# Patient Record
Sex: FEMALE | Race: BLACK OR AFRICAN AMERICAN | NOT HISPANIC OR LATINO | Employment: UNEMPLOYED | URBAN - METROPOLITAN AREA
[De-identification: names, ages, dates, MRNs, and addresses within clinical notes are randomized per-mention and may not be internally consistent; named-entity substitution may affect disease eponyms.]

---

## 2024-08-18 ENCOUNTER — HOSPITAL ENCOUNTER (EMERGENCY)
Facility: HOSPITAL | Age: 19
Discharge: HOME | End: 2024-08-18
Payer: COMMERCIAL

## 2024-08-18 VITALS
WEIGHT: 104.5 LBS | DIASTOLIC BLOOD PRESSURE: 79 MMHG | SYSTOLIC BLOOD PRESSURE: 131 MMHG | RESPIRATION RATE: 16 BRPM | TEMPERATURE: 97.5 F | OXYGEN SATURATION: 100 % | HEART RATE: 79 BPM

## 2024-08-18 DIAGNOSIS — L25.9 CONTACT DERMATITIS: Primary | ICD-10-CM

## 2024-08-18 PROCEDURE — 99284 EMERGENCY DEPT VISIT MOD MDM: CPT

## 2024-08-18 PROCEDURE — 99282 EMERGENCY DEPT VISIT SF MDM: CPT

## 2024-08-18 RX ORDER — PREDNISONE 20 MG/1
60 TABLET ORAL ONCE
Status: COMPLETED | OUTPATIENT
Start: 2024-08-18 | End: 2024-08-18

## 2024-08-18 RX ORDER — PREDNISONE 10 MG/1
TABLET ORAL
Qty: 86 TABLET | Refills: 0 | Status: SHIPPED | OUTPATIENT
Start: 2024-08-19 | End: 2024-09-15

## 2024-08-18 RX ORDER — PREDNISONE 10 MG/1
TABLET ORAL
Qty: 86 TABLET | Refills: 0 | Status: SHIPPED | OUTPATIENT
Start: 2024-08-19 | End: 2024-08-18

## 2024-08-18 RX ADMIN — PREDNISONE 60 MG: 20 TABLET ORAL at 13:22

## 2024-08-18 NOTE — ED PROVIDER NOTES
History  Chief Complaint   Patient presents with    Rash     Pt has had poison ivy over the past few days, pt prescribed ointment and prednisone but states it's been getting more uncomfortable since it's reached her face.      Patient is an otherwise healthy 18y female presenting to emergency department for evaluation of rash.  Patient reports on August 10 August 11 she was moving a tree that fell.  She noted a vesicular rash that started shortly thereafter on her right wrist.  She notes it was initially itchy and has spread to her arms, groin, face, chest.  She notes she was seen on 14 August, 4 days ago, and urgent care who diagnosed her with poison ivy and gave her 20 mg tablets of prednisone x 5 days.  Patient reports her rash has gotten worse since starting the steroids.  She denies fever or oral involvement.  She denies current pregnancy.        None       History reviewed. No pertinent past medical history.    History reviewed. No pertinent surgical history.    History reviewed. No pertinent family history.  I have reviewed and agree with the history as documented.    E-Cigarette/Vaping     E-Cigarette/Vaping Substances     Social History     Tobacco Use    Smoking status: Every Day    Smokeless tobacco: Never       Review of Systems   Constitutional: Negative.  Negative for chills and fever.   HENT: Negative.  Negative for ear pain and sore throat.    Eyes: Negative.  Negative for pain and visual disturbance.   Respiratory: Negative.  Negative for cough and shortness of breath.    Cardiovascular: Negative.  Negative for chest pain and palpitations.   Gastrointestinal: Negative.  Negative for abdominal pain and vomiting.   Endocrine: Negative.    Genitourinary: Negative.  Negative for dysuria and hematuria.   Musculoskeletal: Negative.  Negative for arthralgias and back pain.   Skin:  Positive for rash. Negative for color change.   Allergic/Immunologic: Negative.    Neurological: Negative.  Negative for  seizures and syncope.   Hematological: Negative.    Psychiatric/Behavioral: Negative.     All other systems reviewed and are negative.      Physical Exam  Physical Exam  Vitals and nursing note reviewed.   Constitutional:       General: She is not in acute distress.     Appearance: Normal appearance. She is not ill-appearing, toxic-appearing or diaphoretic.   HENT:      Head: Normocephalic and atraumatic.      Mouth/Throat:      Comments: No intraoral lesions  Eyes:      General: No scleral icterus.        Right eye: No discharge.         Left eye: No discharge.      Extraocular Movements: Extraocular movements intact.      Conjunctiva/sclera: Conjunctivae normal.   Cardiovascular:      Rate and Rhythm: Normal rate.      Pulses: Normal pulses.      Heart sounds: Normal heart sounds. No murmur heard.     No friction rub. No gallop.   Pulmonary:      Effort: Pulmonary effort is normal. No respiratory distress.      Breath sounds: Normal breath sounds. No stridor. No wheezing, rhonchi or rales.   Abdominal:      General: Abdomen is flat. Bowel sounds are normal. There is no distension.      Palpations: Abdomen is soft.      Tenderness: There is no abdominal tenderness. There is no guarding or rebound.   Musculoskeletal:         General: No swelling. Normal range of motion.      Cervical back: Normal range of motion. No rigidity.      Right lower leg: No edema.      Left lower leg: No edema.   Skin:     General: Skin is warm and dry.      Capillary Refill: Capillary refill takes less than 2 seconds.      Coloration: Skin is not jaundiced.      Findings: Rash present. No bruising or lesion.      Comments: Blanching maculopapular rash on both hands, across both arms, chest, abdomen. Sparse vesicular lesions on fingers.   Neurological:      General: No focal deficit present.      Mental Status: She is alert and oriented to person, place, and time. Mental status is at baseline.   Psychiatric:         Mood and Affect: Mood  normal.         Behavior: Behavior normal.         Thought Content: Thought content normal.         Judgment: Judgment normal.         Vital Signs  ED Triage Vitals [08/18/24 1257]   Temperature Pulse Respirations Blood Pressure SpO2   97.5 °F (36.4 °C) 79 16 131/79 100 %      Temp Source Heart Rate Source Patient Position - Orthostatic VS BP Location FiO2 (%)   Temporal Monitor Sitting Left arm --      Pain Score       --           Vitals:    08/18/24 1257   BP: 131/79   Pulse: 79   Patient Position - Orthostatic VS: Sitting         Visual Acuity      ED Medications  Medications   predniSONE tablet 60 mg (60 mg Oral Given 8/18/24 1322)       Diagnostic Studies  Results Reviewed       None                   No orders to display              Procedures  Procedures         ED Course                                               Medical Decision Making  18 year-old female presenting for evaluation of rash    Patient reports a recent exposure to plants.  Rash consistent with contact dermatitis.  Doubt SJS/TN.  Doubt measles, mumps, chickenpox. VSS, afebrile, non-toxic appearing.    No recent medication changes apart from prednisone mentioned above.      Strong suspicion for contact dermatitis in this patient.  Likely secondary to poison ivy.  Patient was placed on a low-dose prednisone burst, she will likely require systemic corticosteroids for severe disease.  I did place her on a prednisone taper beginning at 60 mg x 4 days and tapering there off.  Additionally she has been using steroid cream all over her body.  Recommended she stop using this as prolonged use can cause skin discoloration.  Recommended patient begin using Zanfel.  She was given strict return precautions, in the event that her rash changes she must return immediately for further evaluation.  Additionally, I personally discussed use of steroids with this patient.  Explained to her that she will need to complete the taper and cannot abruptly stop her  steroids as this could lead to catastrophic consequences including dangerously low blood pressure, confusion, etc.  She verbalized understanding. Patient was given prednisone in the emergency department to cover her for today and was discharged with prescription for remainder.  Given instructions to follow-up with her family physician office, she verbalized understanding. Ambulatory referral placed for patient to f/u w/ family medicine. Patient discharged in good condition.    Update: called patient 08/20/24, reports improvement. Reiterated return precautions, instructed to complete prednisone taper and instructed to seek PCP/return to ED for evaluation of she develops new/concerning thoughts including distressing thoughts/nervousness/anxiety.      Risk  Prescription drug management.                 Disposition  Final diagnoses:   Contact dermatitis     Time reflects when diagnosis was documented in both MDM as applicable and the Disposition within this note       Time User Action Codes Description Comment    8/18/2024  1:22 PM Xavier Bush Add [L25.9] Contact dermatitis           ED Disposition       ED Disposition   Discharge    Condition   Stable    Date/Time   Sun Aug 18, 2024  1:28 PM    Comment   Robert Shah discharge to home/self care.                   Follow-up Information       Follow up With Specialties Details Why Contact Info Additional Information    [facility] Family Medicine Schedule an appointment as soon as possible for a visit in 2 days  [address]  [address] [address] [phone] [facility], [address] [facility],  [place], [place], [address] [phone]    [facility] Emergency Department Emergency Medicine Go to  As needed, for new/worsening symptoms. [address] [place] [id number] [phone] [facility] Emergency Department, [address] [place] [address]  [place], [place], [address]            Discharge Medication List as of 8/18/2024  1:28 PM        CONTINUE these medications which have CHANGED    Details   predniSONE 10 mg tablet Multiple Dosages:Starting Mon 8/19/2024, Until Wed 8/21/2024 at 2359, THEN Starting Thu 8/22/2024, Until Sun 8/25/2024 at 2359, THEN Starting Mon 8/26/2024, Until Thu 8/29/2024 at 2359, THEN Starting Fri 8/30/2024, Until Mon 9/2/2024 at 2359, THEN S tarting Tue 9/3/2024, Until Fri 9/6/2024 at 2359, THEN Starting Sat 9/7/2024, Until Tue 9/10/2024 at 2359, THEN Starting Wed 9/11/2024, Until Sat 9/14/2024 at 2359Take 6 tablets (60 mg total) by mouth daily for 3 days, THEN 5 tablets (50 mg total) herrera ly for 4 days, THEN 4 tablets (40 mg total) daily for 4 days, THEN 3 tablets (30 mg total) daily for 4 days, THEN 2 tablets (20 mg total) daily for 4 days, THEN 1 tablet (10 mg total) daily for 4 days, THEN 0.5 tablets (5 mg total) daily for 4 days. 50 m g PO x2 days, decrease by 10 mg every two days until complete.. Do not start before August 19, 2024., Normal                 PDMP Review       None            ED Provider  Electronically Signed by             Xavier Bush DO  08/19/24 1955       Xavier Bush,   08/20/24 2815

## 2024-08-18 NOTE — DISCHARGE INSTRUCTIONS
You must take your steroids through the entire course.  Abruptly stopping your steroids can cause significant side effects including but not limited to dangerously low blood pressure, confusion.  You should follow-up with your family doctor to ensure your rash is resolving.  I put a referral in you can call the number above and they should be able to see you soon.  Return to the ER for any new or worsening symptoms.  Additionally, recommend a medication called Zanfel for your itching.  Do not use steroid cream on your face.  Prolonged steroid cream use can cause local coloration changes to your skin that could be permanent.